# Patient Record
Sex: MALE | Race: BLACK OR AFRICAN AMERICAN | NOT HISPANIC OR LATINO | Employment: FULL TIME | URBAN - NONMETROPOLITAN AREA
[De-identification: names, ages, dates, MRNs, and addresses within clinical notes are randomized per-mention and may not be internally consistent; named-entity substitution may affect disease eponyms.]

---

## 2023-01-25 ENCOUNTER — OFFICE VISIT (OUTPATIENT)
Dept: INTERNAL MEDICINE | Facility: CLINIC | Age: 43
End: 2023-01-25
Payer: OTHER GOVERNMENT

## 2023-01-25 VITALS
RESPIRATION RATE: 16 BRPM | TEMPERATURE: 97.9 F | WEIGHT: 181 LBS | OXYGEN SATURATION: 98 % | DIASTOLIC BLOOD PRESSURE: 70 MMHG | SYSTOLIC BLOOD PRESSURE: 104 MMHG | HEIGHT: 68 IN | BODY MASS INDEX: 27.43 KG/M2 | HEART RATE: 85 BPM

## 2023-01-25 DIAGNOSIS — F41.9 ANXIETY: ICD-10-CM

## 2023-01-25 DIAGNOSIS — Z00.00 ROUTINE GENERAL MEDICAL EXAMINATION AT A HEALTH CARE FACILITY: Primary | ICD-10-CM

## 2023-01-25 DIAGNOSIS — G47.33 OSA (OBSTRUCTIVE SLEEP APNEA): ICD-10-CM

## 2023-01-25 PROCEDURE — 99204 OFFICE O/P NEW MOD 45 MIN: CPT | Performed by: INTERNAL MEDICINE

## 2023-01-25 RX ORDER — NAPROXEN 250 MG/1
250 TABLET ORAL 2 TIMES DAILY PRN
COMMUNITY

## 2023-01-25 RX ORDER — MULTIVITAMIN WITH IRON
1 TABLET ORAL DAILY
Qty: 90 TABLET | Refills: 3 | Status: SHIPPED | OUTPATIENT
Start: 2023-01-25 | End: 2024-01-25

## 2023-01-25 NOTE — PROGRESS NOTES
"Subjective     Patient ID: Florentino Franco is a 42 y.o. male. Patient is here for management of multiple medical problems.     Chief Complaint   Patient presents with   • Anxiety     History of Present Illness   PCP Maricarmen roth in [place].         HA and given NAprxen.     Lomax wakes him up at night. Poor sleep at night.  Sleep is some better.  kasi on cpap now.       Now with anxiety.  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    Slept good.         The following portions of the patient's history were reviewed and updated as appropriate: allergies, current medications, past family history, past medical history, past social history, past surgical history and problem list.    Review of Systems    Current Outpatient Medications:   •  naproxen (NAPROSYN) 250 MG tablet, Take 250 mg by mouth 2 (Two) Times a Day As Needed., Disp: , Rfl:   •  B Complex-C (B-complex with vitamin C) tablet, Take 1 tablet by mouth Daily., Disp: 90 tablet, Rfl: 3    Objective      Blood pressure 104/70, pulse 85, temperature 97.9 °F (36.6 °C), resp. rate 16, height 172.7 cm (68\"), weight 82.1 kg (181 lb), SpO2 98 %.    Physical Exam     General Appearance:    Alert, cooperative, no distress, appears stated age   Head:    Normocephalic, without obvious abnormality, atraumatic   Eyes:    PERRL, conjunctiva/corneas clear, EOM's intact   Ears:    Normal TM's and external ear canals, both ears   Nose:   Nares normal, septum midline, mucosa normal, no drainage   or sinus tenderness   Throat:   Lips, mucosa, and tongue normal; teeth and gums normal   Neck:   Supple, symmetrical, trachea midline, no adenopathy;        thyroid:  No enlargement/tenderness/nodules; no carotid    bruit or JVD   Back:     Symmetric, no curvature, ROM normal, no CVA tenderness   Lungs:     Clear to auscultation bilaterally, respirations unlabored   Chest wall:    No tenderness or deformity   Heart:    Regular rate and rhythm, S1 and S2 normal, no murmur,        rub or " gallop   Abdomen:     Soft, non-tender, bowel sounds active all four quadrants,     no masses, no organomegaly   Extremities:   Extremities normal, atraumatic, no cyanosis or edema   Pulses:   2+ and symmetric all extremities   Skin:   Skin color, texture, turgor normal, no rashes or lesions   Lymph nodes:   Cervical, supraclavicular, and axillary nodes normal   Neurologic:   CNII-XII intact. Normal strength, sensation and reflexes       throughout      No results found for this or any previous visit.      Assessment & Plan   HA less intense and less frequent.   Start melatonin.       [place]            Diagnoses and all orders for this visit:    1. Routine general medical examination at a health care facility (Primary)  -     Ambulatory Referral to Neurology  -     Vitamin B12  -     Comprehensive Metabolic Panel  -     CBC & Differential  -     Lipid Panel  -     TSH  -     T4, Free  -     Hemoglobin A1c  -     [place] Spotted Fever, IgM  -     [place] Spotted Fever, IgG  -     Lyme Disease, PCR - , Arm, Right  -     Ehrlichia Antibody Panel    2. Anxiety  -     Ambulatory Referral to Neurology  -     Vitamin B12  -     Comprehensive Metabolic Panel  -     CBC & Differential  -     Lipid Panel  -     TSH  -     T4, Free  -     Hemoglobin A1c  -     [place] Spotted Fever, IgM  -     [place] Spotted Fever, IgG  -     Lyme Disease, PCR - , Arm, Right  -     Ehrlichia Antibody Panel    3. VIRIDIANA (obstructive sleep apnea)  -     Ambulatory Referral to Neurology  -     Vitamin B12  -     Comprehensive Metabolic Panel  -     CBC & Differential  -     Lipid Panel  -     TSH  -     T4, Free  -     Hemoglobin A1c  -     [place] Spotted Fever, IgM  -     [place] Spotted Fever, IgG  -     Lyme Disease, PCR - , Arm, Right  -     Ehrlichia Antibody Panel    Other orders  -     B Complex-C (B-complex with vitamin C) tablet; Take 1 tablet by mouth Daily.  Dispense: 90 tablet; Refill: 3      Return  in about 6 weeks (around 3/8/2023).          There are no Patient Instructions on file for this visit.     Zeke Rowland MD    Assessment & Plan

## 2023-02-02 LAB
A PHAGOCYTOPH IGG TITR SER IF: NEGATIVE {TITER}
A PHAGOCYTOPH IGM TITR SER IF: NEGATIVE {TITER}
ALBUMIN SERPL-MCNC: 4.9 G/DL (ref 3.5–5.2)
ALBUMIN/GLOB SERPL: 1.6 G/DL
ALP SERPL-CCNC: 68 U/L (ref 39–117)
ALT SERPL-CCNC: 49 U/L (ref 1–41)
AST SERPL-CCNC: 36 U/L (ref 1–40)
B BURGDOR DNA SPEC QL NAA+PROBE: NEGATIVE
BASOPHILS # BLD AUTO: 0.02 10*3/MM3 (ref 0–0.2)
BASOPHILS NFR BLD AUTO: 0.5 % (ref 0–1.5)
BILIRUB SERPL-MCNC: 0.6 MG/DL (ref 0–1.2)
BUN SERPL-MCNC: 10 MG/DL (ref 6–20)
BUN/CREAT SERPL: 10 (ref 7–25)
CALCIUM SERPL-MCNC: 9.9 MG/DL (ref 8.6–10.5)
CHLORIDE SERPL-SCNC: 96 MMOL/L (ref 98–107)
CHOLEST SERPL-MCNC: 208 MG/DL (ref 0–200)
CO2 SERPL-SCNC: 32.7 MMOL/L (ref 22–29)
CREAT SERPL-MCNC: 1 MG/DL (ref 0.76–1.27)
E CHAFFEENSIS IGG TITR SER IF: NEGATIVE {TITER}
E CHAFFEENSIS IGM TITR SER IF: NEGATIVE {TITER}
EGFRCR SERPLBLD CKD-EPI 2021: 97.6 ML/MIN/1.73
EOSINOPHIL # BLD AUTO: 0.07 10*3/MM3 (ref 0–0.4)
EOSINOPHIL NFR BLD AUTO: 1.9 % (ref 0.3–6.2)
ERYTHROCYTE [DISTWIDTH] IN BLOOD BY AUTOMATED COUNT: 13.8 % (ref 12.3–15.4)
GLOBULIN SER CALC-MCNC: 3.1 GM/DL
GLUCOSE SERPL-MCNC: 128 MG/DL (ref 65–99)
HBA1C MFR BLD: 6.4 % (ref 4.8–5.6)
HCT VFR BLD AUTO: 47.3 % (ref 37.5–51)
HDLC SERPL-MCNC: 39 MG/DL (ref 40–60)
HGB BLD-MCNC: 15.9 G/DL (ref 13–17.7)
IMM GRANULOCYTES # BLD AUTO: 0.01 10*3/MM3 (ref 0–0.05)
IMM GRANULOCYTES NFR BLD AUTO: 0.3 % (ref 0–0.5)
LDLC SERPL CALC-MCNC: 125 MG/DL (ref 0–100)
LYMPHOCYTES # BLD AUTO: 1.89 10*3/MM3 (ref 0.7–3.1)
LYMPHOCYTES NFR BLD AUTO: 50.8 % (ref 19.6–45.3)
MCH RBC QN AUTO: 27.7 PG (ref 26.6–33)
MCHC RBC AUTO-ENTMCNC: 33.6 G/DL (ref 31.5–35.7)
MCV RBC AUTO: 82.4 FL (ref 79–97)
MONOCYTES # BLD AUTO: 0.28 10*3/MM3 (ref 0.1–0.9)
MONOCYTES NFR BLD AUTO: 7.5 % (ref 5–12)
NEUTROPHILS # BLD AUTO: 1.45 10*3/MM3 (ref 1.7–7)
NEUTROPHILS NFR BLD AUTO: 39 % (ref 42.7–76)
NRBC BLD AUTO-RTO: 0 /100 WBC (ref 0–0.2)
PLATELET # BLD AUTO: 189 10*3/MM3 (ref 140–450)
POTASSIUM SERPL-SCNC: 4.2 MMOL/L (ref 3.5–5.2)
PROT SERPL-MCNC: 8 G/DL (ref 6–8.5)
R RICKETTSI IGG SER QL IA: NEGATIVE
R RICKETTSI IGM SER-ACNC: 0.88 INDEX (ref 0–0.89)
RBC # BLD AUTO: 5.74 10*6/MM3 (ref 4.14–5.8)
SODIUM SERPL-SCNC: 137 MMOL/L (ref 136–145)
T4 FREE SERPL-MCNC: 1.22 NG/DL (ref 0.93–1.7)
TRIGL SERPL-MCNC: 248 MG/DL (ref 0–150)
TSH SERPL DL<=0.005 MIU/L-ACNC: 1.83 UIU/ML (ref 0.27–4.2)
VIT B12 SERPL-MCNC: 781 PG/ML (ref 211–946)
VLDLC SERPL CALC-MCNC: 44 MG/DL (ref 5–40)
WBC # BLD AUTO: 3.72 10*3/MM3 (ref 3.4–10.8)

## 2023-02-23 ENCOUNTER — OFFICE VISIT (OUTPATIENT)
Dept: INTERNAL MEDICINE | Facility: CLINIC | Age: 43
End: 2023-02-23
Payer: OTHER GOVERNMENT

## 2023-02-23 VITALS
RESPIRATION RATE: 16 BRPM | HEIGHT: 68 IN | OXYGEN SATURATION: 99 % | SYSTOLIC BLOOD PRESSURE: 110 MMHG | TEMPERATURE: 97.1 F | DIASTOLIC BLOOD PRESSURE: 72 MMHG | HEART RATE: 74 BPM | WEIGHT: 180 LBS | BODY MASS INDEX: 27.28 KG/M2

## 2023-02-23 DIAGNOSIS — G47.33 OSA ON CPAP: Primary | ICD-10-CM

## 2023-02-23 DIAGNOSIS — E11.9 TYPE 2 DIABETES MELLITUS WITHOUT COMPLICATION, WITHOUT LONG-TERM CURRENT USE OF INSULIN: ICD-10-CM

## 2023-02-23 DIAGNOSIS — R74.8 ELEVATED LIVER ENZYMES: ICD-10-CM

## 2023-02-23 DIAGNOSIS — Z99.89 OSA ON CPAP: Primary | ICD-10-CM

## 2023-02-23 PROCEDURE — 99214 OFFICE O/P EST MOD 30 MIN: CPT | Performed by: INTERNAL MEDICINE

## 2023-02-23 NOTE — PROGRESS NOTES
"Subjective     Patient ID: Florentino Franco is a 42 y.o. male. Patient is here for management of multiple medical problems.     Chief Complaint   Patient presents with   • Anxiety     History of Present Illness     Has kasi machine and had supplies sent to him.       Anxiety  Some better.        The following portions of the patient's history were reviewed and updated as appropriate: allergies, current medications, past family history, past medical history, past social history, past surgical history and problem list.    Review of Systems    Current Outpatient Medications:   •  naproxen (NAPROSYN) 250 MG tablet, Take 250 mg by mouth 2 (Two) Times a Day As Needed., Disp: , Rfl:   •  B Complex-C (B-complex with vitamin C) tablet, Take 1 tablet by mouth Daily., Disp: 90 tablet, Rfl: 3    Objective      Blood pressure 110/72, pulse 74, temperature 97.1 °F (36.2 °C), resp. rate 16, height 172.7 cm (68\"), weight 81.6 kg (180 lb), SpO2 99 %.    Physical Exam     General Appearance:    Alert, cooperative, no distress, appears stated age   Head:    Normocephalic, without obvious abnormality, atraumatic   Eyes:    PERRL, conjunctiva/corneas clear, EOM's intact   Ears:    Normal TM's and external ear canals, both ears   Nose:   Nares normal, septum midline, mucosa normal, no drainage   or sinus tenderness   Throat:   Lips, mucosa, and tongue normal; teeth and gums normal   Neck:   Supple, symmetrical, trachea midline, no adenopathy;        thyroid:  No enlargement/tenderness/nodules; no carotid    bruit or JVD   Back:     Symmetric, no curvature, ROM normal, no CVA tenderness   Lungs:     Clear to auscultation bilaterally, respirations unlabored   Chest wall:    No tenderness or deformity   Heart:    Regular rate and rhythm, S1 and S2 normal, no murmur,        rub or gallop   Abdomen:     Soft, non-tender, bowel sounds active all four quadrants,     no masses, no organomegaly   Extremities:   Extremities normal, atraumatic, " no cyanosis or edema   Pulses:   2+ and symmetric all extremities   Skin:   Skin color, texture, turgor normal, no rashes or lesions   Lymph nodes:   Cervical, supraclavicular, and axillary nodes normal   Neurologic:   CNII-XII intact. Normal strength, sensation and reflexes       throughout      Results for orders placed or performed in visit on 01/25/23   Lyme Disease, PCR - , Arm, Right    Specimen: Arm, Right   Result Value Ref Range    Lyme Disease(B.burgdorferi)PCR Negative Negative   Vitamin B12    Specimen: Blood   Result Value Ref Range    Vitamin B-12 781 211 - 946 pg/mL   Comprehensive Metabolic Panel    Specimen: Blood   Result Value Ref Range    Glucose 128 (H) 65 - 99 mg/dL    BUN 10 6 - 20 mg/dL    Creatinine 1.00 0.76 - 1.27 mg/dL    EGFR Result 97.6 >60.0 mL/min/1.73    BUN/Creatinine Ratio 10.0 7.0 - 25.0    Sodium 137 136 - 145 mmol/L    Potassium 4.2 3.5 - 5.2 mmol/L    Chloride 96 (L) 98 - 107 mmol/L    Total CO2 32.7 (H) 22.0 - 29.0 mmol/L    Calcium 9.9 8.6 - 10.5 mg/dL    Total Protein 8.0 6.0 - 8.5 g/dL    Albumin 4.9 3.5 - 5.2 g/dL    Globulin 3.1 gm/dL    A/G Ratio 1.6 g/dL    Total Bilirubin 0.6 0.0 - 1.2 mg/dL    Alkaline Phosphatase 68 39 - 117 U/L    AST (SGOT) 36 1 - 40 U/L    ALT (SGPT) 49 (H) 1 - 41 U/L   Lipid Panel    Specimen: Blood   Result Value Ref Range    Total Cholesterol 208 (H) 0 - 200 mg/dL    Triglycerides 248 (H) 0 - 150 mg/dL    HDL Cholesterol 39 (L) 40 - 60 mg/dL    VLDL Cholesterol Raji 44 (H) 5 - 40 mg/dL    LDL Chol Calc (NIH) 125 (H) 0 - 100 mg/dL   TSH    Specimen: Blood   Result Value Ref Range    TSH 1.830 0.270 - 4.200 uIU/mL   T4, Free    Specimen: Blood   Result Value Ref Range    Free T4 1.22 0.93 - 1.70 ng/dL   Hemoglobin A1c    Specimen: Blood   Result Value Ref Range    Hemoglobin A1C 6.40 (H) 4.80 - 5.60 %   [place] Spotted Fever, IgM    Specimen: Blood   Result Value Ref Range    RMSF IgM 0.88 0.00 - 0.89 index   [place] Spotted Fever, IgG     Specimen: Blood   Result Value Ref Range    RMSF IgG Negative Negative   Ehrlichia Antibody Panel    Specimen: Blood   Result Value Ref Range    E. chaffeensis (HME) IgG Titer Negative Neg:<1:64    E. chaffeensis (HME) IgM Titer Negative Neg:<1:20    HGE IgG Titer Negative Neg:<1:64    HGE IgM Titer Negative Neg:<1:20   CBC & Differential    Specimen: Blood   Result Value Ref Range    WBC 3.72 3.40 - 10.80 10*3/mm3    RBC 5.74 4.14 - 5.80 10*6/mm3    Hemoglobin 15.9 13.0 - 17.7 g/dL    Hematocrit 47.3 37.5 - 51.0 %    MCV 82.4 79.0 - 97.0 fL    MCH 27.7 26.6 - 33.0 pg    MCHC 33.6 31.5 - 35.7 g/dL    RDW 13.8 12.3 - 15.4 %    Platelets 189 140 - 450 10*3/mm3    Neutrophil Rel % 39.0 (L) 42.7 - 76.0 %    Lymphocyte Rel % 50.8 (H) 19.6 - 45.3 %    Monocyte Rel % 7.5 5.0 - 12.0 %    Eosinophil Rel % 1.9 0.3 - 6.2 %    Basophil Rel % 0.5 0.0 - 1.5 %    Neutrophils Absolute 1.45 (L) 1.70 - 7.00 10*3/mm3    Lymphocytes Absolute 1.89 0.70 - 3.10 10*3/mm3    Monocytes Absolute 0.28 0.10 - 0.90 10*3/mm3    Eosinophils Absolute 0.07 0.00 - 0.40 10*3/mm3    Basophils Absolute 0.02 0.00 - 0.20 10*3/mm3    Immature Granulocyte Rel % 0.3 0.0 - 0.5 %    Immature Grans Absolute 0.01 0.00 - 0.05 10*3/mm3    nRBC 0.0 0.0 - 0.2 /100 WBC         Assessment & Plan     Anxiety some better.    Diet changes discussed.        Diagnoses and all orders for this visit:    1. VIRIDIANA on CPAP (Primary)  -     Comprehensive Metabolic Panel  -     Hemoglobin A1c  -     MicroAlbumin, Urine, Random - Urine, Clean Catch    2. Elevated liver enzymes  -     Comprehensive Metabolic Panel  -     Hemoglobin A1c  -     MicroAlbumin, Urine, Random - Urine, Clean Catch    3. Type 2 diabetes mellitus without complication, without long-term current use of insulin ([facility])  -     Comprehensive Metabolic Panel  -     Hemoglobin A1c  -     MicroAlbumin, Urine, Random - Urine, Clean Catch      Return in about 4 months (around 6/23/2023).          There are no Patient  Instructions on file for this visit.     Zeke Rowland MD    Assessment & Plan

## 2023-04-19 ENCOUNTER — TELEPHONE (OUTPATIENT)
Dept: INTERNAL MEDICINE | Facility: CLINIC | Age: 43
End: 2023-04-19

## 2023-04-19 NOTE — TELEPHONE ENCOUNTER
Caller: Florentino Franco    Relationship: Self    Best call back number: [phone]    What is the best time to reach you: ANYTIME    Who are you requesting to speak with (clinical staff, provider,  specific staff member): PROVIDER    What was the call regarding: PATIENT WOULD LIKE TO DISCUSS RECEIVING AN ANTIBIOTIC TO PREVENT MALARIA. PLEASE ADVISE    Do you require a callback: YES

## 2023-04-19 NOTE — TELEPHONE ENCOUNTER
Patient is going to [facility]. is requiring for prophylaxis before granting leave.

## 2023-04-20 ENCOUNTER — OFFICE VISIT (OUTPATIENT)
Dept: INTERNAL MEDICINE | Facility: CLINIC | Age: 43
End: 2023-04-20
Payer: OTHER GOVERNMENT

## 2023-04-20 VITALS
OXYGEN SATURATION: 96 % | HEIGHT: 68 IN | SYSTOLIC BLOOD PRESSURE: 110 MMHG | TEMPERATURE: 97.5 F | DIASTOLIC BLOOD PRESSURE: 68 MMHG | WEIGHT: 179 LBS | HEART RATE: 68 BPM | BODY MASS INDEX: 27.13 KG/M2

## 2023-04-20 DIAGNOSIS — B54 MALARIA: Primary | ICD-10-CM

## 2023-04-20 PROCEDURE — 99214 OFFICE O/P EST MOD 30 MIN: CPT | Performed by: INTERNAL MEDICINE

## 2023-04-20 RX ORDER — DOXYCYCLINE HYCLATE 100 MG/1
100 CAPSULE ORAL DAILY
Qty: 90 CAPSULE | Refills: 0 | Status: SHIPPED | OUTPATIENT
Start: 2023-04-20

## 2023-04-20 NOTE — PROGRESS NOTES
"Subjective     Patient ID: Florentino Franco is a 43 y.o. male. Patient is here for management of multiple medical problems.     Chief Complaint   Patient presents with   • Travel Consult     History of Present Illness   Travel to [facility]. 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      The following portions of the patient's history were reviewed and updated as appropriate: allergies, current medications, past family history, past medical history, past social history, past surgical history and problem list.    Review of Systems    Current Outpatient Medications:   •  B Complex-C (B-complex with vitamin C) tablet, Take 1 tablet by mouth Daily., Disp: 90 tablet, Rfl: 3  •  doxycycline (VIBRAMYCIN) 100 MG capsule, Take 1 capsule by mouth Daily. Take 1-2 days before leaving. Continue while in [facility] and for 4 weeks on return to the ., Disp: 90 capsule, Rfl: 0  •  naproxen (NAPROSYN) 250 MG tablet, Take 250 mg by mouth 2 (Two) Times a Day As Needed., Disp: , Rfl:     Objective      Blood pressure 110/68, pulse 68, temperature 97.5 °F (36.4 °C), height 172.7 cm (68\"), weight 81.2 kg (179 lb), SpO2 96 %.    Physical Exam     General Appearance:    Alert, cooperative, no distress, appears stated age   Head:    Normocephalic, without obvious abnormality, atraumatic   Eyes:    PERRL, conjunctiva/corneas clear, EOM's intact   Ears:    Normal TM's and external ear canals, both ears   Nose:   Nares normal, septum midline, mucosa normal, no drainage   or sinus tenderness   Throat:   Lips, mucosa, and tongue normal; teeth and gums normal   Neck:   Supple, symmetrical, trachea midline, no adenopathy;        thyroid:  No enlargement/tenderness/nodules; no carotid    bruit or JVD   Back:     Symmetric, no curvature, ROM normal, no CVA tenderness   Lungs:     Clear to auscultation bilaterally, respirations unlabored   Chest wall:    No tenderness or deformity   Heart:    Regular rate and " rhythm, S1 and S2 normal, no murmur,        rub or gallop   Abdomen:     Soft, non-tender, bowel sounds active all four quadrants,     no masses, no organomegaly   Extremities:   Extremities normal, atraumatic, no cyanosis or edema   Pulses:   2+ and symmetric all extremities   Skin:   Skin color, texture, turgor normal, no rashes or lesions   Lymph nodes:   Cervical, supraclavicular, and axillary nodes normal   Neurologic:   CNII-XII intact. Normal strength, sensation and reflexes       throughout      Results for orders placed or performed in visit on 01/25/23   Lyme Disease, PCR - , Arm, Right    Specimen: Arm, Right   Result Value Ref Range    Lyme Disease(B.burgdorferi)PCR Negative Negative   Vitamin B12    Specimen: Blood   Result Value Ref Range    Vitamin B-12 781 211 - 946 pg/mL   Comprehensive Metabolic Panel    Specimen: Blood   Result Value Ref Range    Glucose 128 (H) 65 - 99 mg/dL    BUN 10 6 - 20 mg/dL    Creatinine 1.00 0.76 - 1.27 mg/dL    EGFR Result 97.6 >60.0 mL/min/1.73    BUN/Creatinine Ratio 10.0 7.0 - 25.0    Sodium 137 136 - 145 mmol/L    Potassium 4.2 3.5 - 5.2 mmol/L    Chloride 96 (L) 98 - 107 mmol/L    Total CO2 32.7 (H) 22.0 - 29.0 mmol/L    Calcium 9.9 8.6 - 10.5 mg/dL    Total Protein 8.0 6.0 - 8.5 g/dL    Albumin 4.9 3.5 - 5.2 g/dL    Globulin 3.1 gm/dL    A/G Ratio 1.6 g/dL    Total Bilirubin 0.6 0.0 - 1.2 mg/dL    Alkaline Phosphatase 68 39 - 117 U/L    AST (SGOT) 36 1 - 40 U/L    ALT (SGPT) 49 (H) 1 - 41 U/L   Lipid Panel    Specimen: Blood   Result Value Ref Range    Total Cholesterol 208 (H) 0 - 200 mg/dL    Triglycerides 248 (H) 0 - 150 mg/dL    HDL Cholesterol 39 (L) 40 - 60 mg/dL    VLDL Cholesterol Raji 44 (H) 5 - 40 mg/dL    LDL Chol Calc (NIH) 125 (H) 0 - 100 mg/dL   TSH    Specimen: Blood   Result Value Ref Range    TSH 1.830 0.270 - 4.200 uIU/mL   T4, Free    Specimen: Blood   Result Value Ref Range    Free T4 1.22 0.93 - 1.70 ng/dL   Hemoglobin A1c    Specimen: Blood    Result Value Ref Range    Hemoglobin A1C 6.40 (H) 4.80 - 5.60 %   [place] Spotted Fever, IgM    Specimen: Blood   Result Value Ref Range    RMSF IgM 0.88 0.00 - 0.89 index   [facility] Spotted Fever, IgG    Specimen: Blood   Result Value Ref Range    RMSF IgG Negative Negative   Ehrlichia Antibody Panel    Specimen: Blood   Result Value Ref Range    E. chaffeensis (HME) IgG Titer Negative Neg:<1:64    E. chaffeensis (HME) IgM Titer Negative Neg:<1:20    HGE IgG Titer Negative Neg:<1:64    HGE IgM Titer Negative Neg:<1:20   CBC & Differential    Specimen: Blood   Result Value Ref Range    WBC 3.72 3.40 - 10.80 10*3/mm3    RBC 5.74 4.14 - 5.80 10*6/mm3    Hemoglobin 15.9 13.0 - 17.7 g/dL    Hematocrit 47.3 37.5 - 51.0 %    MCV 82.4 79.0 - 97.0 fL    MCH 27.7 26.6 - 33.0 pg    MCHC 33.6 31.5 - 35.7 g/dL    RDW 13.8 12.3 - 15.4 %    Platelets 189 140 - 450 10*3/mm3    Neutrophil Rel % 39.0 (L) 42.7 - 76.0 %    Lymphocyte Rel % 50.8 (H) 19.6 - 45.3 %    Monocyte Rel % 7.5 5.0 - 12.0 %    Eosinophil Rel % 1.9 0.3 - 6.2 %    Basophil Rel % 0.5 0.0 - 1.5 %    Neutrophils Absolute 1.45 (L) 1.70 - 7.00 10*3/mm3    Lymphocytes Absolute 1.89 0.70 - 3.10 10*3/mm3    Monocytes Absolute 0.28 0.10 - 0.90 10*3/mm3    Eosinophils Absolute 0.07 0.00 - 0.40 10*3/mm3    Basophils Absolute 0.02 0.00 - 0.20 10*3/mm3    Immature Granulocyte Rel % 0.3 0.0 - 0.5 %    Immature Grans Absolute 0.01 0.00 - 0.05 10*3/mm3    nRBC 0.0 0.0 - 0.2 /100 WBC         Assessment & Plan     [facility]     [facility]  Yellow Fever  Requirements: Required for arriving travelers from all countries if traveler is ?9 months of age.    Recommendations: Recommended for all travelers ?9 months of age.    Malaria  Transmission areas    All  Drug resistance3    Chloroquine  Species    P. falciparum (primarily)  P. malariae, P. ovale, and P. vivax (less commonly)  Recommended chemoprophylaxis    Atovaquone-proguanil, doxycycline, mefloquine, tafenoquine4      Pt  with old rx of Malarone form [facility].   Not familiar with the med but significant se.   Will opt for doxy.     [facility].    Pt is fro [facility]      Diagnoses and all orders for this visit:    1. Malaria (Primary)    Other orders  -     doxycycline (VIBRAMYCIN) 100 MG capsule; Take 1 capsule by mouth Daily. Take 1-2 days before leaving. Continue while in [facility] and for 4 weeks on return to the [place].  Dispense: 90 capsule; Refill: 0      No follow-ups on file.          There are no Patient Instructions on file for this visit.     Zeke Rowland MD    Assessment & Plan

## 2023-05-19 ENCOUNTER — TELEPHONE (OUTPATIENT)
Dept: INTERNAL MEDICINE | Facility: CLINIC | Age: 43
End: 2023-05-19
Payer: OTHER GOVERNMENT

## 2023-05-19 DIAGNOSIS — Z20.1 EXPOSURE TO TB: Primary | ICD-10-CM

## 2023-05-19 NOTE — TELEPHONE ENCOUNTER
Patient came in office to let me know that he needs an order placed for a TB quantiferon gold ordered.     For [employer] courses.

## 2023-05-24 LAB
GAMMA INTERFERON BACKGROUND BLD IA-ACNC: >10 IU/ML
M TB IFN-G BLD-IMP: ABNORMAL
M TB IFN-G CD4+ T-CELLS BLD-ACNC: >10 IU/ML
M TBIFN-G CD4+ CD8+T-CELLS BLD-ACNC: >10 IU/ML
MITOGEN IGNF BLD-ACNC: >10 IU/ML
QUANTIFERON INCUBATION: ABNORMAL
SERVICE CMNT-IMP: ABNORMAL

## 2023-05-25 ENCOUNTER — HOSPITAL ENCOUNTER (OUTPATIENT)
Dept: GENERAL RADIOLOGY | Facility: HOSPITAL | Age: 43
Discharge: HOME | End: 2023-05-25
Admitting: INTERNAL MEDICINE
Payer: OTHER GOVERNMENT

## 2023-05-25 DIAGNOSIS — R76.12 REACTION TO QUANTIFERON-TB TEST: ICD-10-CM

## 2023-05-25 DIAGNOSIS — R76.12 REACTION TO QUANTIFERON-TB TEST: Primary | ICD-10-CM

## 2023-05-25 PROCEDURE — 71046 X-RAY EXAM CHEST 2 VIEWS: CPT

## 2023-06-13 ENCOUNTER — TELEPHONE (OUTPATIENT)
Dept: INTERNAL MEDICINE | Facility: CLINIC | Age: 43
End: 2023-06-13
Payer: OTHER GOVERNMENT

## 2023-06-13 NOTE — TELEPHONE ENCOUNTER
Called patient to schedule an appointment to discuss pulmonology referral with Dr. Rowland, informed patient by leaving voicemail to contact our office to schedule appointment. Hub may deliver message and schedule an appointment.

## 2024-07-08 ENCOUNTER — HOSPITAL ENCOUNTER (OUTPATIENT)
Dept: GENERAL RADIOLOGY | Facility: HOSPITAL | Age: 44
Discharge: HOME | End: 2024-07-08
Admitting: FAMILY MEDICINE
Payer: OTHER GOVERNMENT

## 2024-07-08 ENCOUNTER — TRANSCRIBE ORDERS (OUTPATIENT)
Dept: LAB | Facility: HOSPITAL | Age: 44
End: 2024-07-08
Payer: OTHER GOVERNMENT

## 2024-07-08 DIAGNOSIS — Z11.1 SCREENING EXAMINATION FOR PULMONARY TUBERCULOSIS: Primary | ICD-10-CM

## 2024-07-08 DIAGNOSIS — Z11.1 SCREENING EXAMINATION FOR PULMONARY TUBERCULOSIS: ICD-10-CM

## 2024-07-08 PROCEDURE — 71046 X-RAY EXAM CHEST 2 VIEWS: CPT

## 2024-08-27 ENCOUNTER — LAB (OUTPATIENT)
Dept: LAB | Facility: HOSPITAL | Age: 44
End: 2024-08-27
Payer: OTHER GOVERNMENT

## 2024-08-27 ENCOUNTER — CONSULT (OUTPATIENT)
Dept: ONCOLOGY | Facility: CLINIC | Age: 44
End: 2024-08-27
Payer: OTHER GOVERNMENT

## 2024-08-27 VITALS
TEMPERATURE: 98.6 F | RESPIRATION RATE: 18 BRPM | SYSTOLIC BLOOD PRESSURE: 132 MMHG | DIASTOLIC BLOOD PRESSURE: 81 MMHG | OXYGEN SATURATION: 98 % | BODY MASS INDEX: 25.54 KG/M2 | WEIGHT: 168.5 LBS | HEART RATE: 87 BPM | HEIGHT: 68 IN

## 2024-08-27 DIAGNOSIS — D70.9 NEUTROPENIA, UNSPECIFIED TYPE: Primary | ICD-10-CM

## 2024-08-27 LAB
BASOPHILS # BLD AUTO: 0.01 10*3/MM3 (ref 0–0.2)
BASOPHILS NFR BLD AUTO: 0.2 % (ref 0–1.5)
DEPRECATED RDW RBC AUTO: 36.3 FL (ref 37–54)
EOSINOPHIL # BLD AUTO: 0.01 10*3/MM3 (ref 0–0.4)
EOSINOPHIL NFR BLD AUTO: 0.2 % (ref 0.3–6.2)
ERYTHROCYTE [DISTWIDTH] IN BLOOD BY AUTOMATED COUNT: 12.4 % (ref 12.3–15.4)
HCT VFR BLD AUTO: 45.8 % (ref 37.5–51)
HGB BLD-MCNC: 15.7 G/DL (ref 13–17.7)
IMM GRANULOCYTES # BLD AUTO: 0.01 10*3/MM3 (ref 0–0.05)
IMM GRANULOCYTES NFR BLD AUTO: 0.2 % (ref 0–0.5)
LYMPHOCYTES # BLD AUTO: 1.34 10*3/MM3 (ref 0.7–3.1)
LYMPHOCYTES NFR BLD AUTO: 31.5 % (ref 19.6–45.3)
MCH RBC QN AUTO: 27.8 PG (ref 26.6–33)
MCHC RBC AUTO-ENTMCNC: 34.3 G/DL (ref 31.5–35.7)
MCV RBC AUTO: 81.1 FL (ref 79–97)
MONOCYTES # BLD AUTO: 0.44 10*3/MM3 (ref 0.1–0.9)
MONOCYTES NFR BLD AUTO: 10.4 % (ref 5–12)
NEUTROPHILS NFR BLD AUTO: 2.44 10*3/MM3 (ref 1.7–7)
NEUTROPHILS NFR BLD AUTO: 57.5 % (ref 42.7–76)
NRBC BLD AUTO-RTO: 0 /100 WBC (ref 0–0.2)
PLATELET # BLD AUTO: 152 10*3/MM3 (ref 140–450)
PMV BLD AUTO: 10 FL (ref 6–12)
RBC # BLD AUTO: 5.65 10*6/MM3 (ref 4.14–5.8)
WBC NRBC COR # BLD AUTO: 4.25 10*3/MM3 (ref 3.4–10.8)

## 2024-08-27 PROCEDURE — 82746 ASSAY OF FOLIC ACID SERUM: CPT | Performed by: INTERNAL MEDICINE

## 2024-08-27 PROCEDURE — 85025 COMPLETE CBC W/AUTO DIFF WBC: CPT | Performed by: INTERNAL MEDICINE

## 2024-08-27 PROCEDURE — 99204 OFFICE O/P NEW MOD 45 MIN: CPT | Performed by: INTERNAL MEDICINE

## 2024-08-27 PROCEDURE — 36415 COLL VENOUS BLD VENIPUNCTURE: CPT | Performed by: INTERNAL MEDICINE

## 2024-08-27 PROCEDURE — 82607 VITAMIN B-12: CPT | Performed by: INTERNAL MEDICINE

## 2024-08-27 RX ORDER — SIMVASTATIN 20 MG
20 TABLET ORAL EVERY EVENING
COMMUNITY
Start: 2024-07-09

## 2024-08-27 RX ORDER — ASPIRIN 81 MG/1
1 TABLET, COATED ORAL DAILY
COMMUNITY
Start: 2024-07-09

## 2024-08-27 NOTE — PROGRESS NOTES
"  Subjective     PROBLEM LIST:  Neutropenia  Prediabetes  anxiety    CHIEF COMPLAINT: neutropenia      HISTORY OF PRESENT ILLNESS:  The patient is a 44 y.o. male, referred for evaluation of neutropenia.    Labs:  1/30/23: WBC 3.72, hgb 15.9, mcv 82, plt 189, ANC 1.45    9/13/23: WBC 3.3, ANC 1.4    6/26/24: WBC 3.2, ANC 1.3    He is in the  and says that he has had labs through the  every year for a long time.  However he does not currently have access to these records.  He does not recall ever being told in the past that his white blood cells were low..    He does not have any recurrent fevers or infections.  No regular need for antibiotics.    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    He grew up in [place].      REVIEW OF SYSTEMS:  A 14 point review of systems was performed and is negative except as noted above.    Past Medical History:   Diagnosis Date    Anxiety              Objective     /81   Pulse 87   Temp 98.6 °F (37 °C)   Resp 18   Ht 172.7 cm (68\")   Wt 76.4 kg (168 lb 8 oz)   SpO2 98%   BMI 25.62 kg/m²   Performance Status:  ECOG score: 0           General: well appearing male in no acute distress  Neuro: alert and oriented  HEENT: sclerae anicteric, oropharynx clear  Extremities: no lower extremity edema  Skin: no rashes, lesions, bruising, or petechiae  Psych: mood and affect appropriate    Lab Results   Component Value Date    WBC 3.72 01/30/2023    HGB 15.9 01/30/2023    HCT 47.3 01/30/2023    MCV 82.4 01/30/2023     01/30/2023     Lab Results   Component Value Date    GLUCOSE 128 (H) 01/30/2023    BUN 10 01/30/2023    CREATININE 1.00 01/30/2023    BCR 10.0 01/30/2023    K 4.2 01/30/2023    CO2 32.7 (H) 01/30/2023    CALCIUM 9.9 01/30/2023    PROTENTOTREF 8.0 01/30/2023    ALBUMIN 4.9 01/30/2023    LABIL2 1.6 01/30/2023    AST 36 01/30/2023    ALT 49 (H) 01/30/2023       XR Chest 2 View  Narrative: " PROCEDURE: XR CHEST 2 VW-        HISTORY: Screen; Z11.1-Encounter for screening for respiratory  tuberculosis     COMPARISON: May 2023.     FINDINGS: Apical lordotic view. The heart is normal in size. The  mediastinum is unremarkable. The lungs are clear. There is no  pneumothorax. There are no acute osseous abnormalities.     Impression: No acute cardiopulmonary process.           Images were reviewed, interpreted, and dictated by Dr. Chely Nicole MD  Transcribed by Dalia Patino PA-C.     This report was signed and finalized on 7/8/2024 12:18 PM by Chely Nicole MD.               ASSESSMENT AND PLAN:     Florentino Ruiz is a 44 y.o. male with chronic mild neutropenia.    We have labs available going back about 18 months that he fairly consistently show an ANC between 3903-7985.  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  This is seen in individuals with  heritage and is a phenotype which is protective against malaria.  The fact that his labs have been stable over 18 months is reassuring and suggest that there is not a malignant cause of this.    We will recheck his CBC today, and I will also check B12 and folate levels.  If his labs remain stable and there is no evidence of vitamin deficiency, then I would not recommend further workup at this time.           A total greater than 45 mins minutes was spent in face to face patient time, examination, counseling, charting, reviewing test results, and reviewing outside records.    Yulia Lizama MD    8/27/2024

## 2024-08-27 NOTE — LETTER
"August 27, 2024     Blake Maradiaga MD  [address] [place] [id number]    Patient: Florentino Ruiz   YOB: 1980   Date of Visit: 8/27/2024       Dear Blake Maradiaga MD    Florentino Ruiz was in my office today. Below is a copy of my note.    If you have questions, please do not hesitate to call me. I look forward to following Florentino along with you.         Sincerely,        Yulia Lizama MD        CC: Ban Ruiz MD      Subjective    PROBLEM LIST:  Neutropenia  Prediabetes  anxiety    CHIEF COMPLAINT: neutropenia      HISTORY OF PRESENT ILLNESS:  The patient is a 44 y.o. male, referred for evaluation of neutropenia.    Labs:  1/30/23: WBC 3.72, hgb 15.9, mcv 82, plt 189, ANC 1.45    9/13/23: WBC 3.3, ANC 1.4    6/26/24: WBC 3.2, ANC 1.3    He is in the  and says that he has had labs through the  every year for a long time.  However he does not currently have access to these records.  He does not recall ever being told in the past that his white blood cells were low..    He does not have any recurrent fevers or infections.  No regular need for antibiotics.    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    He grew up in [facility] [place].      REVIEW OF SYSTEMS:  A 14 point review of systems was performed and is negative except as noted above.    Past Medical History:   Diagnosis Date   • Anxiety              Objective    /81   Pulse 87   Temp 98.6 °F (37 °C)   Resp 18   Ht 172.7 cm (68\")   Wt 76.4 kg (168 lb 8 oz)   SpO2 98%   BMI 25.62 kg/m²   Performance Status:  ECOG score: 0           General: well appearing male in no acute distress  Neuro: alert and oriented  HEENT: sclerae anicteric, oropharynx clear  Extremities: no lower extremity edema  Skin: no rashes, lesions, bruising, or petechiae  Psych: mood and affect appropriate    Lab Results   Component Value Date    WBC " 3.72 01/30/2023    HGB 15.9 01/30/2023    HCT 47.3 01/30/2023    MCV 82.4 01/30/2023     01/30/2023     Lab Results   Component Value Date    GLUCOSE 128 (H) 01/30/2023    BUN 10 01/30/2023    CREATININE 1.00 01/30/2023    BCR 10.0 01/30/2023    K 4.2 01/30/2023    CO2 32.7 (H) 01/30/2023    CALCIUM 9.9 01/30/2023    PROTENTOTREF 8.0 01/30/2023    ALBUMIN 4.9 01/30/2023    LABIL2 1.6 01/30/2023    AST 36 01/30/2023    ALT 49 (H) 01/30/2023       XR Chest 2 View  Narrative: PROCEDURE: XR CHEST 2 VW-        HISTORY: Screen; Z11.1-Encounter for screening for respiratory  tuberculosis     COMPARISON: May 2023.     FINDINGS: Apical lordotic view. The heart is normal in size. The  mediastinum is unremarkable. The lungs are clear. There is no  pneumothorax. There are no acute osseous abnormalities.     Impression: No acute cardiopulmonary process.           Images were reviewed, interpreted, and dictated by Dr. Chely Nicole MD  Transcribed by Dalia Patino PA-C.     This report was signed and finalized on 7/8/2024 12:18 PM by Chely Nicole MD.               ASSESSMENT AND PLAN:     Florentino Ruiz is a 44 y.o. male with chronic mild neutropenia.    We have labs available going back about 18 months that he fairly consistently show an ANC between 3416-3930.  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  This is seen in individuals with  heritage and is a phenotype which is protective against malaria.  The fact that his labs have been stable over 18 months is reassuring and suggest that there is not a malignant cause of this.    We will recheck his CBC today, and I will also check B12 and folate levels.  If his labs remain stable and there is no evidence of vitamin deficiency, then I would not recommend further workup at this time.           A total greater than 45 mins minutes was spent in face to face patient time, examination, counseling,  charting, reviewing test results, and reviewing outside records.    Yulia Lizama MD    8/27/2024

## 2024-08-28 ENCOUNTER — TELEPHONE (OUTPATIENT)
Dept: ONCOLOGY | Facility: CLINIC | Age: 44
End: 2024-08-28
Payer: OTHER GOVERNMENT

## 2024-08-28 LAB
FOLATE SERPL-MCNC: 11.3 NG/ML (ref 4.78–24.2)
VIT B12 BLD-MCNC: 774 PG/ML (ref 211–946)

## 2024-08-28 NOTE — TELEPHONE ENCOUNTER
I called patient per Dr. Lizama to tell him that his repeat labs show normal b12/folate, low normal WBC and normal neutrophil count.  As  discussed with him yesterday  this is likely a genetic variant.  No further follow up needed.   I had to leave voicemail as patient did not answer.